# Patient Record
(demographics unavailable — no encounter records)

---

## 2018-02-24 NOTE — RADRPT
EXAM DATE/TIME:  02/24/2018 16:13 

 

[facility] COMPARISON:     

No previous studies available for comparison.

 

                     

INDICATIONS :     

Right foot pain, no injury.

                     

 

MEDICAL HISTORY :     

None.          

 

SURGICAL HISTORY :        

Bunionectomy, right foot

 

ENCOUNTER:     

Initial                                        

 

ACUITY:     

4 - 6 days      

 

PAIN SCORE:     

6/10

 

LOCATION:     

Right  foot, distal first digit

 

FINDINGS:     

Two view examination of the right foot demonstrates no soft tissue swelling, dislocation, or fracture
.  The calcaneus is intact.  Bony mineralization is normal. There is evidence of previous surgery to 
the distal second metatarsal. There is mild degenerative changes at the first metatarsal-phalangeal j
oint.

 

CONCLUSION:     

1. No acute fracture or joint dislocation.

2. Mild degenerative changes of the first metatarsal phalangeal joint.

 

 

 

 Stalin Tse MD on February 24, 2018 at 16:32           

Board Certified Radiologist.

 This report was verified electronically.

## 2018-02-24 NOTE — PD
HPI


Chief Complaint:  Skin Problem


Time Seen by Provider:  15:58


Travel History


International Travel<30 days:  No


Contact w/Intl Traveler<30days:  No


Traveled to known affect area:  No





History of Present Illness


HPI


63yo F with PMH of DM, COPD here with c/o darkening of her right big toe.  Pt 

has been having back area on right big toe for 2 weeks and went to see her 

primary care physician 2 days ago.  Pt said she was told to come to the ED if 

symptoms worsen.  Pt said the darkness is spreading and the area below the nail 

seemed darker so decided to come in.  Denies any fever, pain, chest pain, sob, n

/v, abdominal pain, focal weakness or numbness.





PFSH


Social History


Tobacco Use:  No





Allergies-Medications


(Allergen,Severity, Reaction):  


Coded Allergies:  


     No Known Allergies (Unverified , 2/24/18)





Review of Systems


Except as stated in HPI:  all other systems reviewed are Neg





Physical Exam


Narrative


GEN: 63yo F not in distress.


Head: Normocephalic, atraumatic.


CV: S1, S2.


Lungs: CTA B/L, equal breath sounds.


Abd: soft, NT/ND.  No rebound tenderness or guarding.


Ext: Right foot: +Black discoloration and callous medial aspect of right big 

toe.  Cold to touch.  DP 2+.  Sensation intact.  Pt has decreased sensation in 

right big toe that is chronic.





Data


Data


Last Documented VS





Vital Signs








  Date Time  Temp Pulse Resp B/P (MAP) Pulse Ox O2 Delivery O2 Flow Rate FiO2


 


2/24/18 17:50        


 


2/24/18 15:23   16     


 


2/24/18 14:37 98.4 106   100 Room Air  








Orders





 Orders


Foot, Limited (2vws) (2/24/18 )


Complete Blood Count With Diff (2/24/18 16:06)


Basic Metabolic Panel (Bmp) (2/24/18 16:06)


Ed Discharge Order (2/24/18 17:43)





Labs





Laboratory Tests








Test


  2/24/18


16:20


 


White Blood Count 11.2 TH/MM3 


 


Red Blood Count 4.17 MIL/MM3 


 


Hemoglobin 12.1 GM/DL 


 


Hematocrit 35.6 % 


 


Mean Corpuscular Volume 85.4 FL 


 


Mean Corpuscular Hemoglobin 29.1 PG 


 


Mean Corpuscular Hemoglobin


Concent 34.1 % 


 


 


Red Cell Distribution Width 15.5 % 


 


Platelet Count 375 TH/MM3 


 


Mean Platelet Volume 7.9 FL 


 


Neutrophils (%) (Auto) 52.3 % 


 


Lymphocytes (%) (Auto) 36.7 % 


 


Monocytes (%) (Auto) 6.3 % 


 


Eosinophils (%) (Auto) 3.9 % 


 


Basophils (%) (Auto) 0.8 % 


 


Neutrophils # (Auto) 5.8 TH/MM3 


 


Lymphocytes # (Auto) 4.1 TH/MM3 


 


Monocytes # (Auto) 0.7 TH/MM3 


 


Eosinophils # (Auto) 0.4 TH/MM3 


 


Basophils # (Auto) 0.1 TH/MM3 


 


CBC Comment DIFF FINAL 


 


Differential Comment  


 


Blood Urea Nitrogen 9 MG/DL 


 


Creatinine 1.16 MG/DL 


 


Random Glucose 121 MG/DL 


 


Calcium Level 9.0 MG/DL 


 


Sodium Level 135 MEQ/L 


 


Potassium Level 3.6 MEQ/L 


 


Chloride Level 100 MEQ/L 


 


Carbon Dioxide Level 27.2 MEQ/L 


 


Anion Gap 8 MEQ/L 


 


Estimat Glomerular Filtration


Rate 47 ML/MIN 


 











MDM


Medical Decision Making


Medical Screen Exam Complete:  Yes


Emergency Medical Condition:  Yes


Differential Diagnosis


Dry gangrene vs. diabetic foot


Narrative Course


63yo F with 2 weeks of dark discoloration in right big toe.  Has podiatry 

appointment on Wednesday but decide to come because it looks darker. There is 

no discharge, no foul smell, no erythema, edema.  Pt has no pain.  It is cool 

to touch but pt has good DP pulses.  Labs reviewed, WBC 11.2.  BMP 

unremarkable.  Xray right foot showed no acute fracture.  Mild degenerative 

change of first metatarsal phalangeal joint.  Briefly discussed with Dr. Mejia from podiatry and he recommends follow up with her podiatrist if no 

elevated WBC or signs of infection.  Pt has podiatrist but will still give our 

podiatrist's information.





Diagnosis





 Primary Impression:  


 Dry gangrene


Referrals:  


Cyrus Mejia DPM


as needed


Patient Instructions:  General Instructions


Departure Forms:  Tests/Procedures





***Additional Instructions:  


Please follow up with your podiatrist as well as primary care for possible 

referral to vascular surgeon.  Return to the ED if symptoms worsen.


***Med/Other Pt SpecificInfo:  No Change to Meds


Disposition:  01 DISCHARGE HOME


Condition:  Stable











Ailyn Castellanos  Feb 24, 2018 16:19